# Patient Record
Sex: MALE | Race: OTHER | Employment: UNEMPLOYED | URBAN - METROPOLITAN AREA
[De-identification: names, ages, dates, MRNs, and addresses within clinical notes are randomized per-mention and may not be internally consistent; named-entity substitution may affect disease eponyms.]

---

## 2019-01-04 ENCOUNTER — APPOINTMENT (OUTPATIENT)
Dept: GENERAL RADIOLOGY | Facility: HOSPITAL | Age: 59
End: 2019-01-04
Attending: EMERGENCY MEDICINE

## 2019-01-04 ENCOUNTER — HOSPITAL ENCOUNTER (EMERGENCY)
Facility: HOSPITAL | Age: 59
Discharge: HOME | End: 2019-01-04
Attending: EMERGENCY MEDICINE

## 2019-01-04 VITALS
RESPIRATION RATE: 18 BRPM | HEART RATE: 80 BPM | BODY MASS INDEX: 24.5 KG/M2 | OXYGEN SATURATION: 99 % | SYSTOLIC BLOOD PRESSURE: 126 MMHG | HEIGHT: 71 IN | TEMPERATURE: 98 F | WEIGHT: 175 LBS | DIASTOLIC BLOOD PRESSURE: 78 MMHG

## 2019-01-04 DIAGNOSIS — S43.014A ANTERIOR DISLOCATION OF RIGHT SHOULDER, INITIAL ENCOUNTER: Primary | ICD-10-CM

## 2019-01-04 PROCEDURE — 23650 CLTX SHO DSLC W/MNPJ WO ANES: CPT

## 2019-01-04 PROCEDURE — 99284 EMERGENCY DEPT VISIT MOD MDM: CPT

## 2019-01-04 PROCEDURE — 96374 THER/PROPH/DIAG INJ IV PUSH: CPT

## 2019-01-04 PROCEDURE — 73030 X-RAY EXAM OF SHOULDER: CPT | Performed by: EMERGENCY MEDICINE

## 2019-01-04 PROCEDURE — 96376 TX/PRO/DX INJ SAME DRUG ADON: CPT

## 2019-01-04 PROCEDURE — 0RSJXZZ REPOSITION RIGHT SHOULDER JOINT, EXTERNAL APPROACH: ICD-10-PCS | Performed by: EMERGENCY MEDICINE

## 2019-01-04 RX ORDER — MORPHINE SULFATE 4 MG/ML
4 INJECTION, SOLUTION INTRAMUSCULAR; INTRAVENOUS ONCE
Status: COMPLETED | OUTPATIENT
Start: 2019-01-04 | End: 2019-01-04

## 2019-01-04 RX ORDER — MORPHINE SULFATE 4 MG/ML
INJECTION, SOLUTION INTRAMUSCULAR; INTRAVENOUS
Status: COMPLETED
Start: 2019-01-04 | End: 2019-01-04

## 2019-01-04 NOTE — ED PROVIDER NOTES
Patient Seen in: [facility] AND [facility] Emergency Department    History   No chief complaint on file.     Stated Complaint: dislocated shoulder    HPI    41-year-old male presents for complaint of sudden onset of right shoulder pain which occurred while he was Exam   Constitutional: He is oriented to person, place, and time. He appears well-developed and well-nourished. HENT:   Head: Normocephalic and atraumatic. Neck: Normal range of motion. Neck supple.    Cardiovascular: Normal rate, regular rhythm and nor encouraged.      Imaging:   Xr Shoulder, Complete (min 2 Views), Right (cpt=73030)    Result Date: 1/4/2019  PROCEDURE: XR SHOULDER, COMPLETE (MIN 2 VIEWS), RIGHT (CPT=73030)  COMPARISON: [facility], XR SHOULDER, COMPLETE (MIN 2 VIEWS), RIG precautions were given. Patient voices understanding and agreement with the treatment plan. All questions were addressed and answered.  \                 Disposition and Plan     Clinical Impression:  Anterior dislocation of right shoulder, initial encounte

## 2023-01-01 NOTE — ED INITIAL ASSESSMENT (HPI)
Pt to rm 45 in [facility] from triage and triaged at bedside for complaint of a possible right shoulder dislocation after playing basketball and doing a layup, pt denies fall, states his shoulder has dislocated in the past and has had surgery.   +csm 0.876

## (undated) NOTE — ED AVS SNAPSHOT
Nehemias Fernie   MRN: [id number]    Department:  [facility] Emergency Department   Date of Visit:  1/4/2019           Disclosure     Insurance plans vary and the physician(s) referred by the ER may not be covered by your plan.  Please contact CARE PHYSICIAN AT ONCE OR RETURN IMMEDIATELY TO THE EMERGENCY DEPARTMENT. If you have been prescribed any medication(s), please fill your prescription right away and begin taking the medication(s) as directed.   If you believe that any of the medications